# Patient Record
Sex: FEMALE | Race: WHITE | NOT HISPANIC OR LATINO | URBAN - NONMETROPOLITAN AREA
[De-identification: names, ages, dates, MRNs, and addresses within clinical notes are randomized per-mention and may not be internally consistent; named-entity substitution may affect disease eponyms.]

---

## 2018-03-19 PROBLEM — H25.813: Noted: 2018-03-19

## 2018-03-19 PROBLEM — H52.223: Noted: 2018-03-19

## 2018-03-19 PROBLEM — H52.4: Noted: 2018-03-19

## 2018-03-19 PROBLEM — H52.03: Noted: 2018-03-19

## 2020-08-05 ENCOUNTER — IMPORTED ENCOUNTER (OUTPATIENT)
Dept: URBAN - NONMETROPOLITAN AREA CLINIC 1 | Facility: CLINIC | Age: 50
End: 2020-08-05

## 2020-08-05 PROCEDURE — 92014 COMPRE OPH EXAM EST PT 1/>: CPT

## 2020-08-05 PROCEDURE — 92015 DETERMINE REFRACTIVE STATE: CPT

## 2020-08-05 NOTE — PATIENT DISCUSSION
DM s DR-Stressed the importance of keeping blood sugars under control blood pressure under control and weight normalization and regular visits with PCP. -Explained the possible effects of poorly controlled diabetes and the damage that diabetes can cause to ocular health. -Patient to check HgbA1C.-Pt instructed to contact our office with any vision changes. - Last A1C 5.7%Cataract OU trace-Not yet surgical. -Reviewed symptoms of advancing cataract growth such as glare and halos and decreased vision.-Continue to monitor for now. Pt will notify us if any new symptoms develop. Hyperopia-Discussed diagnosis with patient. Astigmatism-Discussed diagnosis with patient. Presbyopia-Discussed diagnosis with patient. Updated spec Rx given. Recommend lens that will provide comfort as well as protect safety and health of eyes.

## 2022-04-15 ASSESSMENT — VISUAL ACUITY
OS_CC: J3
OS_SC: 20/50-2
OD_CC: J3
OD_SC: 20/25-1

## 2022-04-15 ASSESSMENT — TONOMETRY
OD_IOP_MMHG: 20
OS_IOP_MMHG: 20

## 2025-08-06 ENCOUNTER — NEW PATIENT (OUTPATIENT)
Age: 55
End: 2025-08-06

## 2025-08-06 DIAGNOSIS — H25.813: ICD-10-CM

## 2025-08-06 PROCEDURE — 92004 COMPRE OPH EXAM NEW PT 1/>: CPT
